# Patient Record
Sex: MALE | Race: WHITE | NOT HISPANIC OR LATINO | URBAN - METROPOLITAN AREA
[De-identification: names, ages, dates, MRNs, and addresses within clinical notes are randomized per-mention and may not be internally consistent; named-entity substitution may affect disease eponyms.]

---

## 2018-05-28 ENCOUNTER — INPATIENT (INPATIENT)
Facility: HOSPITAL | Age: 48
LOS: 2 days | Discharge: HOME | End: 2018-05-31
Attending: SPECIALIST | Admitting: SPECIALIST
Payer: COMMERCIAL

## 2018-05-28 VITALS
HEART RATE: 100 BPM | OXYGEN SATURATION: 97 % | SYSTOLIC BLOOD PRESSURE: 133 MMHG | RESPIRATION RATE: 16 BRPM | TEMPERATURE: 99 F | DIASTOLIC BLOOD PRESSURE: 92 MMHG

## 2018-05-28 DIAGNOSIS — K57.92 DIVERTICULITIS OF INTESTINE, PART UNSPECIFIED, WITHOUT PERFORATION OR ABSCESS WITHOUT BLEEDING: ICD-10-CM

## 2018-05-28 LAB
ALBUMIN SERPL ELPH-MCNC: 3.9 G/DL — SIGNIFICANT CHANGE UP (ref 3.3–5)
ALP SERPL-CCNC: 107 U/L — SIGNIFICANT CHANGE UP (ref 40–120)
ALT FLD-CCNC: 51 U/L — HIGH (ref 4–41)
AST SERPL-CCNC: 29 U/L — SIGNIFICANT CHANGE UP (ref 4–40)
BILIRUB SERPL-MCNC: 0.8 MG/DL — SIGNIFICANT CHANGE UP (ref 0.2–1.2)
BUN SERPL-MCNC: 13 MG/DL — SIGNIFICANT CHANGE UP (ref 7–23)
CALCIUM SERPL-MCNC: 9.1 MG/DL — SIGNIFICANT CHANGE UP (ref 8.4–10.5)
CHLORIDE SERPL-SCNC: 96 MMOL/L — LOW (ref 98–107)
CO2 SERPL-SCNC: 25 MMOL/L — SIGNIFICANT CHANGE UP (ref 22–31)
CREAT SERPL-MCNC: 0.78 MG/DL — SIGNIFICANT CHANGE UP (ref 0.5–1.3)
GLUCOSE SERPL-MCNC: 132 MG/DL — HIGH (ref 70–99)
HCT VFR BLD CALC: 41.3 % — SIGNIFICANT CHANGE UP (ref 39–50)
HGB BLD-MCNC: 13.8 G/DL — SIGNIFICANT CHANGE UP (ref 13–17)
LIDOCAIN IGE QN: 277.7 U/L — HIGH (ref 7–60)
MCHC RBC-ENTMCNC: 29.2 PG — SIGNIFICANT CHANGE UP (ref 27–34)
MCHC RBC-ENTMCNC: 33.4 % — SIGNIFICANT CHANGE UP (ref 32–36)
MCV RBC AUTO: 87.3 FL — SIGNIFICANT CHANGE UP (ref 80–100)
NRBC # FLD: 0 — SIGNIFICANT CHANGE UP
PLATELET # BLD AUTO: 270 K/UL — SIGNIFICANT CHANGE UP (ref 150–400)
PMV BLD: 9 FL — SIGNIFICANT CHANGE UP (ref 7–13)
POTASSIUM SERPL-MCNC: 4.4 MMOL/L — SIGNIFICANT CHANGE UP (ref 3.5–5.3)
POTASSIUM SERPL-SCNC: 4.4 MMOL/L — SIGNIFICANT CHANGE UP (ref 3.5–5.3)
PROT SERPL-MCNC: 7.3 G/DL — SIGNIFICANT CHANGE UP (ref 6–8.3)
RBC # BLD: 4.73 M/UL — SIGNIFICANT CHANGE UP (ref 4.2–5.8)
RBC # FLD: 12.2 % — SIGNIFICANT CHANGE UP (ref 10.3–14.5)
SODIUM SERPL-SCNC: 134 MMOL/L — LOW (ref 135–145)
WBC # BLD: 22.8 K/UL — HIGH (ref 3.8–10.5)
WBC # FLD AUTO: 22.8 K/UL — HIGH (ref 3.8–10.5)

## 2018-05-28 PROCEDURE — 74177 CT ABD & PELVIS W/CONTRAST: CPT | Mod: 26

## 2018-05-28 RX ORDER — SODIUM CHLORIDE 9 MG/ML
1000 INJECTION INTRAMUSCULAR; INTRAVENOUS; SUBCUTANEOUS ONCE
Qty: 0 | Refills: 0 | Status: COMPLETED | OUTPATIENT
Start: 2018-05-28 | End: 2018-05-28

## 2018-05-28 RX ORDER — METRONIDAZOLE 500 MG
500 TABLET ORAL ONCE
Qty: 0 | Refills: 0 | Status: COMPLETED | OUTPATIENT
Start: 2018-05-28 | End: 2018-05-28

## 2018-05-28 RX ORDER — SODIUM CHLORIDE 9 MG/ML
1000 INJECTION, SOLUTION INTRAVENOUS
Qty: 0 | Refills: 0 | Status: DISCONTINUED | OUTPATIENT
Start: 2018-05-28 | End: 2018-05-29

## 2018-05-28 RX ORDER — ONDANSETRON 8 MG/1
4 TABLET, FILM COATED ORAL ONCE
Qty: 0 | Refills: 0 | Status: COMPLETED | OUTPATIENT
Start: 2018-05-28 | End: 2018-05-28

## 2018-05-28 RX ORDER — CIPROFLOXACIN LACTATE 400MG/40ML
400 VIAL (ML) INTRAVENOUS EVERY 12 HOURS
Qty: 0 | Refills: 0 | Status: DISCONTINUED | OUTPATIENT
Start: 2018-05-28 | End: 2018-05-30

## 2018-05-28 RX ORDER — CIPROFLOXACIN LACTATE 400MG/40ML
400 VIAL (ML) INTRAVENOUS ONCE
Qty: 0 | Refills: 0 | Status: COMPLETED | OUTPATIENT
Start: 2018-05-28 | End: 2018-05-28

## 2018-05-28 RX ORDER — MORPHINE SULFATE 50 MG/1
4 CAPSULE, EXTENDED RELEASE ORAL ONCE
Qty: 0 | Refills: 0 | Status: DISCONTINUED | OUTPATIENT
Start: 2018-05-28 | End: 2018-05-28

## 2018-05-28 RX ORDER — ENOXAPARIN SODIUM 100 MG/ML
40 INJECTION SUBCUTANEOUS DAILY
Qty: 0 | Refills: 0 | Status: DISCONTINUED | OUTPATIENT
Start: 2018-05-28 | End: 2018-05-31

## 2018-05-28 RX ORDER — METRONIDAZOLE 500 MG
500 TABLET ORAL EVERY 8 HOURS
Qty: 0 | Refills: 0 | Status: DISCONTINUED | OUTPATIENT
Start: 2018-05-28 | End: 2018-05-30

## 2018-05-28 RX ADMIN — Medication 200 MILLIGRAM(S): at 14:38

## 2018-05-28 RX ADMIN — MORPHINE SULFATE 4 MILLIGRAM(S): 50 CAPSULE, EXTENDED RELEASE ORAL at 13:03

## 2018-05-28 RX ADMIN — Medication 100 MILLIGRAM(S): at 21:44

## 2018-05-28 RX ADMIN — ENOXAPARIN SODIUM 40 MILLIGRAM(S): 100 INJECTION SUBCUTANEOUS at 17:35

## 2018-05-28 RX ADMIN — SODIUM CHLORIDE 1000 MILLILITER(S): 9 INJECTION INTRAMUSCULAR; INTRAVENOUS; SUBCUTANEOUS at 16:19

## 2018-05-28 RX ADMIN — SODIUM CHLORIDE 125 MILLILITER(S): 9 INJECTION, SOLUTION INTRAVENOUS at 17:35

## 2018-05-28 RX ADMIN — MORPHINE SULFATE 4 MILLIGRAM(S): 50 CAPSULE, EXTENDED RELEASE ORAL at 13:18

## 2018-05-28 RX ADMIN — ONDANSETRON 4 MILLIGRAM(S): 8 TABLET, FILM COATED ORAL at 13:03

## 2018-05-28 RX ADMIN — SODIUM CHLORIDE 1000 MILLILITER(S): 9 INJECTION INTRAMUSCULAR; INTRAVENOUS; SUBCUTANEOUS at 13:03

## 2018-05-28 RX ADMIN — Medication 100 MILLIGRAM(S): at 14:13

## 2018-05-28 NOTE — ED PROVIDER NOTE - OBJECTIVE STATEMENT
47 yr old male with no sig PMH presents with following hx 2 months of abdominal distension but for 2 days significant LLQ.  Denies diarrhea, hematochezia.  Went to [facility] and was told to come to ED.  Denies fever, vomiting.

## 2018-05-28 NOTE — H&P ADULT - NSHPPHYSICALEXAM_GEN_ALL_CORE
Vital Signs Last 24 Hrs  T(C): 37.2 (28 May 2018 11:26), Max: 37.2 (28 May 2018 11:26)  T(F): 99 (28 May 2018 11:26), Max: 99 (28 May 2018 11:26)  HR: 104 (28 May 2018 16:20) (100 - 104)  BP: 128/76 (28 May 2018 16:20) (128/76 - 133/92)  BP(mean): --  RR: 18 (28 May 2018 16:20) (16 - 18)  SpO2: 100% (28 May 2018 16:20) (97% - 100%)    General: No acute distress, appears nontoxic  Neurologic: No focal deficits  Psychiatric: Appropriate affect  Cardiovascular: Regular rate and rhythm  Pulmonary: Unlabored breathing  Abdominal: Soft, distended, LLQ abdominal tenderness   Extremities: No edema, moves all without limitations  Skin: Warm, no rashes

## 2018-05-28 NOTE — PATIENT PROFILE ADULT. - VISION (WITH CORRECTIVE LENSES IF THE PATIENT USUALLY WEARS THEM):
reading & distance/Normal vision: sees adequately in most situations; can see medication labels, newsprint

## 2018-05-28 NOTE — H&P ADULT - PROBLEM SELECTOR PLAN 1
- No acute surgical intervention  - NPO/IVF  - broad spectrum IV antibiotics  - serial abdominal exams  - Discussed with Dr. Lainez

## 2018-05-28 NOTE — H&P ADULT - HISTORY OF PRESENT ILLNESS
47 year old man with no significant medical/surgical history presents with 2-3 days of left lower quadrant abdominal pain.  The pain is moderate, nonradiating and with no alleviating or aggravating factors.  He has associated abdominal distension.  No fevers/chills.  He has been passing flatus and having normal bowel movements.  He has never had a colonoscopy.

## 2018-05-28 NOTE — ED ADULT NURSE NOTE - OBJECTIVE STATEMENT
pt in rm 12. i. alert,oriented x3. surg resident to eval at present. pt c/o abd pains with abd distended x mos. denies n,v presently.  will continue  to monitor

## 2018-05-28 NOTE — ED PROVIDER NOTE - PROGRESS NOTE DETAILS
Dr. Flores: pt with elevated lipase and LLQ pain with criteria for sepsis; have consulted surgery and started abx; pending CT; will sign out to incoming attending

## 2018-05-28 NOTE — H&P ADULT - NSHPLABSRESULTS_GEN_ALL_CORE
CBC (05-28 @ 13:13)                          13.8                     22.80<H>  )--------------(  270        --    % Neuts, --    % Lymphs, ANC: --                              41.3      BMP (05-28 @ 13:13)       134<L>  |  96<L>   |  13    			Ca++ --      Ca 9.1          ---------------------------------( 132<H>		Mg --           4.4     |  25      |  0.78  			Ph --        LFTs (05-28 @ 13:13)      TPro 7.3 / Alb 3.9 / TBili 0.8 / DBili -- / AST 29 / ALT 51<H> / AlkPhos 107      Imaging  CT abdomen/pelvis showing sigmoid diverticulitis, no associated abscess or massive free air on preliminary read

## 2018-05-28 NOTE — H&P ADULT - ATTENDING COMMENTS
Above noted. the patient has been intermittently symptomatic  (abdominal pain and distention) for the last two to three months.   Physical Exam: Afebrile  Abdomen: Soft, distended, tender over the left lower quadrant.  Labs: WBC 92591.  Plan: NPO, IV antibiotic, DVT prophylaxis, pain control, close observation.

## 2018-05-28 NOTE — H&P ADULT - ASSESSMENT
47 year old man with no significant medical or surgical history presents with first episode diverticulitis, uncomplicated. 47 year old man with no significant medical or surgical history presents with first episode of sigmoid diverticulitis

## 2018-05-29 ENCOUNTER — TRANSCRIPTION ENCOUNTER (OUTPATIENT)
Age: 48
End: 2018-05-29

## 2018-05-29 LAB
BUN SERPL-MCNC: 8 MG/DL — SIGNIFICANT CHANGE UP (ref 7–23)
CALCIUM SERPL-MCNC: 8.9 MG/DL — SIGNIFICANT CHANGE UP (ref 8.4–10.5)
CHLORIDE SERPL-SCNC: 98 MMOL/L — SIGNIFICANT CHANGE UP (ref 98–107)
CO2 SERPL-SCNC: 25 MMOL/L — SIGNIFICANT CHANGE UP (ref 22–31)
CREAT SERPL-MCNC: 0.82 MG/DL — SIGNIFICANT CHANGE UP (ref 0.5–1.3)
GLUCOSE SERPL-MCNC: 104 MG/DL — HIGH (ref 70–99)
HCT VFR BLD CALC: 38.9 % — LOW (ref 39–50)
HGB BLD-MCNC: 13 G/DL — SIGNIFICANT CHANGE UP (ref 13–17)
MAGNESIUM SERPL-MCNC: 2.3 MG/DL — SIGNIFICANT CHANGE UP (ref 1.6–2.6)
MCHC RBC-ENTMCNC: 29 PG — SIGNIFICANT CHANGE UP (ref 27–34)
MCHC RBC-ENTMCNC: 33.4 % — SIGNIFICANT CHANGE UP (ref 32–36)
MCV RBC AUTO: 86.8 FL — SIGNIFICANT CHANGE UP (ref 80–100)
NRBC # FLD: 0 — SIGNIFICANT CHANGE UP
PHOSPHATE SERPL-MCNC: 2.7 MG/DL — SIGNIFICANT CHANGE UP (ref 2.5–4.5)
PLATELET # BLD AUTO: 263 K/UL — SIGNIFICANT CHANGE UP (ref 150–400)
PMV BLD: 9.1 FL — SIGNIFICANT CHANGE UP (ref 7–13)
POTASSIUM SERPL-MCNC: 4.3 MMOL/L — SIGNIFICANT CHANGE UP (ref 3.5–5.3)
POTASSIUM SERPL-SCNC: 4.3 MMOL/L — SIGNIFICANT CHANGE UP (ref 3.5–5.3)
RBC # BLD: 4.48 M/UL — SIGNIFICANT CHANGE UP (ref 4.2–5.8)
RBC # FLD: 12.1 % — SIGNIFICANT CHANGE UP (ref 10.3–14.5)
SODIUM SERPL-SCNC: 137 MMOL/L — SIGNIFICANT CHANGE UP (ref 135–145)
WBC # BLD: 13.21 K/UL — HIGH (ref 3.8–10.5)
WBC # FLD AUTO: 13.21 K/UL — HIGH (ref 3.8–10.5)

## 2018-05-29 RX ORDER — SODIUM CHLORIDE 9 MG/ML
1000 INJECTION, SOLUTION INTRAVENOUS
Qty: 0 | Refills: 0 | Status: DISCONTINUED | OUTPATIENT
Start: 2018-05-29 | End: 2018-05-30

## 2018-05-29 RX ADMIN — Medication 63.75 MILLIMOLE(S): at 09:25

## 2018-05-29 RX ADMIN — SODIUM CHLORIDE 100 MILLILITER(S): 9 INJECTION, SOLUTION INTRAVENOUS at 09:27

## 2018-05-29 RX ADMIN — Medication 200 MILLIGRAM(S): at 13:27

## 2018-05-29 RX ADMIN — ENOXAPARIN SODIUM 40 MILLIGRAM(S): 100 INJECTION SUBCUTANEOUS at 13:39

## 2018-05-29 RX ADMIN — Medication 200 MILLIGRAM(S): at 01:19

## 2018-05-29 RX ADMIN — Medication 100 MILLIGRAM(S): at 13:27

## 2018-05-29 RX ADMIN — Medication 100 MILLIGRAM(S): at 05:57

## 2018-05-29 RX ADMIN — Medication 100 MILLIGRAM(S): at 21:35

## 2018-05-29 NOTE — DISCHARGE NOTE ADULT - HOSPITAL COURSE
47 year old man with no significant medical/surgical history presents with 2-3 days of left lower quadrant abdominal pain.  The pain is moderate, nonradiating and with no alleviating or aggravating factors.  He has associated abdominal distension.  No fevers/chills.  He has been passing flatus and having normal bowel movements.  He has never had a colonoscopy.  Pt admitted to [facility] for diverticulitis. CT on 5/28 showed: Perforated sigmoid diverticulitis. No adjacent defined fluid collection or abscess.   Pt was made NPO, started on IVF, IV antibiotics and  provided with pain medication as needed.  Pt had serial ABD exams.  Pt's leukocytosis improved along with diminished abdominal pain/tenderness.  Pt's diet was slowly advanced as tolerated. At this time pt deemed stable for discharge with continued antibiotics and follow up in the office. 47 year old man with no significant medical/surgical history presents with 2-3 days of left lower quadrant abdominal pain.  The pain is moderate, nonradiating and with no alleviating or aggravating factors.  He has associated abdominal distension.  No fevers/chills.  He has been passing flatus and having normal bowel movements.  He has never had a colonoscopy.  Pt admitted to [facility] for diverticulitis. CT on 5/28 showed: Perforated sigmoid diverticulitis. No adjacent defined fluid collection or abscess.   Pt was made NPO, started on IVF, IV antibiotics and  provided with pain medication as needed.  Pt had serial ABD exams.  Pt's leukocytosis improved along with diminished abdominal pain/tenderness.  Pt's diet was slowly advanced as tolerated. At this time pt deemed stable for discharge with continued antibiotics and follow up in the office.

## 2018-05-29 NOTE — DISCHARGE NOTE ADULT - CARE PROVIDER_API CALL
Rai Lainez), Surgery  [address] [facility]  [address] [place] [id number]  Phone: [phone]  Fax: [phone]

## 2018-05-29 NOTE — DISCHARGE NOTE ADULT - PLAN OF CARE
Continue low fiber diet Please call the doctor immediately if you develop fever, chills, inability to tolerate liquid or food, diarrhea, nausea, vomiting or return of abdominal pain. Please follow up with Dr. Lainez and with your  primary care doctor within 1 week of discharge. Please call the doctor immediately if you develop fever, chills, inability to tolerate liquid or food, diarrhea, nausea, vomiting or return of abdominal pain. Please follow up with Dr. Lainez and with your primary care doctor within 1 week of discharge.

## 2018-05-29 NOTE — DISCHARGE NOTE ADULT - CARE PLAN
Principal Discharge DX:	Diverticulitis  Goal:	Continue low fiber diet  Assessment and plan of treatment:	Please call the doctor immediately if you develop fever, chills, inability to tolerate liquid or food, diarrhea, nausea, vomiting or return of abdominal pain. Please follow up with Dr. Lainez and with your  primary care doctor within 1 week of discharge. Principal Discharge DX:	Diverticulitis  Goal:	Continue low fiber diet  Assessment and plan of treatment:	Please call the doctor immediately if you develop fever, chills, inability to tolerate liquid or food, diarrhea, nausea, vomiting or return of abdominal pain. Please follow up with Dr. Lainez and with your primary care doctor within 1 week of discharge.

## 2018-05-29 NOTE — PROGRESS NOTE ADULT - ATTENDING COMMENTS
Above noted. Feels better, passing flatus, had a bowel movement this morning.  Physical Exam: Low grade fever.  Abdomen: Soft, obese, less distended, less tender.  Labs: WBC 12823.  Plan: Continue NPO except for sips of water. IV antibiotic, DVT prophylaxis.

## 2018-05-29 NOTE — DISCHARGE NOTE ADULT - CONDITIONS AT DISCHARGE
Pt A&Ox4. Vs stable. Pain well controlled. Pt OOB, ambulating in room and on unit, tolerating regular diet, and voiding adequately.

## 2018-05-29 NOTE — PROGRESS NOTE ADULT - SUBJECTIVE AND OBJECTIVE BOX
General Surgery Progress Note  JODI NICHOLS    S: No acute events overnight. Patient has adequate pain control. Denies N/V and fevers/chills. Overall doing well.    O:  T(C): 37 (05-29-18 @ 05:57), Max: 37.8 (05-28-18 @ 21:53)  HR: 99 (05-29-18 @ 05:57) (99 - 111)  BP: 141/81 (05-29-18 @ 05:57) (128/76 - 141/94)  RR: 18 (05-29-18 @ 05:57) (16 - 18)  SpO2: 100% (05-29-18 @ 05:57) (96% - 100%)        Physical Exam:  Gen: NAD  Neuro: Follows commands  Resp: No acute respiratory distress, normal effort  CV: Normal rate, regular rhythm  Abd: Soft, ND, tenderness improved.    A/P: 47 year old man with no significant medical or surgical history presents with first episode of sigmoid diverticulitis. Pain improved.    - Cont IV abx  - NPO/IVF  - Pain control  - OOB as tolerated  - Monitor GI/ fxn  - Dispo: Floor

## 2018-05-29 NOTE — DISCHARGE NOTE ADULT - PATIENT PORTAL LINK FT
You can access the [facility] Patient Portal, offered by [facility], by registering with the following website: [email or web address]

## 2018-05-29 NOTE — DISCHARGE NOTE ADULT - MEDICATION SUMMARY - MEDICATIONS TO TAKE
I will START or STAY ON the medications listed below when I get home from the hospital:    metroNIDAZOLE 500 mg oral tablet  -- 1 tab(s) by mouth every 8 hours  -- Indication: For Diverticulitis of intestine without perforation or abscess without bleeding    ciprofloxacin 500 mg oral tablet  -- 1 tab(s) by mouth every 12 hours  -- Indication: For Diverticulitis of intestine without perforation or abscess without bleeding

## 2018-05-29 NOTE — DISCHARGE NOTE ADULT - INSTRUCTIONS
Low fiber diet Please NOTIFY MD for any of the following s/s: S/S infection (Fever >100.4, chills), uncontrolled pain not relieved by pain medications, persistent nausea/vomiting or inability to tolerate diet. No driving while taking narcotic pain medications. Please drink 6-8 glasses of water daily to stay hydrated.

## 2018-05-30 LAB
BLD GP AB SCN SERPL QL: NEGATIVE — SIGNIFICANT CHANGE UP
BUN SERPL-MCNC: 7 MG/DL — SIGNIFICANT CHANGE UP (ref 7–23)
CALCIUM SERPL-MCNC: 9.2 MG/DL — SIGNIFICANT CHANGE UP (ref 8.4–10.5)
CHLORIDE SERPL-SCNC: 97 MMOL/L — LOW (ref 98–107)
CO2 SERPL-SCNC: 27 MMOL/L — SIGNIFICANT CHANGE UP (ref 22–31)
CREAT SERPL-MCNC: 0.84 MG/DL — SIGNIFICANT CHANGE UP (ref 0.5–1.3)
GLUCOSE SERPL-MCNC: 111 MG/DL — HIGH (ref 70–99)
HCT VFR BLD CALC: 38 % — LOW (ref 39–50)
HGB BLD-MCNC: 12.8 G/DL — LOW (ref 13–17)
MAGNESIUM SERPL-MCNC: 2.4 MG/DL — SIGNIFICANT CHANGE UP (ref 1.6–2.6)
MCHC RBC-ENTMCNC: 29.4 PG — SIGNIFICANT CHANGE UP (ref 27–34)
MCHC RBC-ENTMCNC: 33.7 % — SIGNIFICANT CHANGE UP (ref 32–36)
MCV RBC AUTO: 87.4 FL — SIGNIFICANT CHANGE UP (ref 80–100)
NRBC # FLD: 0 — SIGNIFICANT CHANGE UP
PHOSPHATE SERPL-MCNC: 3.6 MG/DL — SIGNIFICANT CHANGE UP (ref 2.5–4.5)
PLATELET # BLD AUTO: 291 K/UL — SIGNIFICANT CHANGE UP (ref 150–400)
PMV BLD: 8.9 FL — SIGNIFICANT CHANGE UP (ref 7–13)
POTASSIUM SERPL-MCNC: 4.3 MMOL/L — SIGNIFICANT CHANGE UP (ref 3.5–5.3)
POTASSIUM SERPL-SCNC: 4.3 MMOL/L — SIGNIFICANT CHANGE UP (ref 3.5–5.3)
RBC # BLD: 4.35 M/UL — SIGNIFICANT CHANGE UP (ref 4.2–5.8)
RBC # FLD: 12 % — SIGNIFICANT CHANGE UP (ref 10.3–14.5)
RH IG SCN BLD-IMP: POSITIVE — SIGNIFICANT CHANGE UP
SODIUM SERPL-SCNC: 137 MMOL/L — SIGNIFICANT CHANGE UP (ref 135–145)
WBC # BLD: 8.74 K/UL — SIGNIFICANT CHANGE UP (ref 3.8–10.5)
WBC # FLD AUTO: 8.74 K/UL — SIGNIFICANT CHANGE UP (ref 3.8–10.5)

## 2018-05-30 RX ORDER — CIPROFLOXACIN LACTATE 400MG/40ML
500 VIAL (ML) INTRAVENOUS EVERY 12 HOURS
Qty: 0 | Refills: 0 | Status: DISCONTINUED | OUTPATIENT
Start: 2018-05-30 | End: 2018-05-31

## 2018-05-30 RX ORDER — METRONIDAZOLE 500 MG
500 TABLET ORAL ONCE
Qty: 0 | Refills: 0 | Status: DISCONTINUED | OUTPATIENT
Start: 2018-05-30 | End: 2018-05-30

## 2018-05-30 RX ORDER — METRONIDAZOLE 500 MG
500 TABLET ORAL EVERY 8 HOURS
Qty: 0 | Refills: 0 | Status: DISCONTINUED | OUTPATIENT
Start: 2018-05-30 | End: 2018-05-30

## 2018-05-30 RX ORDER — METRONIDAZOLE 500 MG
500 TABLET ORAL EVERY 8 HOURS
Qty: 0 | Refills: 0 | Status: DISCONTINUED | OUTPATIENT
Start: 2018-05-30 | End: 2018-05-31

## 2018-05-30 RX ORDER — METRONIDAZOLE 500 MG
TABLET ORAL
Qty: 0 | Refills: 0 | Status: DISCONTINUED | OUTPATIENT
Start: 2018-05-30 | End: 2018-05-30

## 2018-05-30 RX ADMIN — Medication 200 MILLIGRAM(S): at 01:43

## 2018-05-30 RX ADMIN — Medication 500 MILLIGRAM(S): at 21:36

## 2018-05-30 RX ADMIN — Medication 200 MILLIGRAM(S): at 13:04

## 2018-05-30 RX ADMIN — Medication 100 MILLIGRAM(S): at 06:28

## 2018-05-30 RX ADMIN — Medication 100 MILLIGRAM(S): at 13:04

## 2018-05-30 RX ADMIN — ENOXAPARIN SODIUM 40 MILLIGRAM(S): 100 INJECTION SUBCUTANEOUS at 13:04

## 2018-05-30 NOTE — PROGRESS NOTE ADULT - SUBJECTIVE AND OBJECTIVE BOX
General Surgery Progress Note  JODI JUANK    S: No acute events overnight. Patient has adequate pain control. Denies N/V and fevers/chills. Overall doing well.    O:  Vital Signs Last 24 Hrs  T(C): 37.3 (30 May 2018 02:09), Max: 37.3 (30 May 2018 02:09)  T(F): 99.2 (30 May 2018 02:09), Max: 99.2 (30 May 2018 02:09)  HR: 88 (30 May 2018 02:09) (88 - 101)  BP: 113/74 (30 May 2018 02:09) (112/78 - 141/81)  BP(mean): --  RR: 18 (30 May 2018 02:09) (16 - 18)  SpO2: 98% (30 May 2018 02:09) (96% - 100%)        Physical Exam:  Gen: NAD  Neuro: Follows commands  Resp: No acute respiratory distress, normal effort  CV: Normal rate, regular rhythm  Abd: Soft, ND, tenderness improved.    A/P: 47 year old man with no significant medical or surgical history presents with first episode of sigmoid diverticulitis. Pain improved.    - Cont IV abx  - Sips, possibly adv to clears later today   - Pain control  - OOB as tolerated  - Monitor GI/ fxn  - Dispo: Floor

## 2018-05-30 NOTE — PROGRESS NOTE ADULT - ATTENDING COMMENTS
Above noted. Feels much better, tolerating a clear liquid diet, having diarrhea.  Physical Exam: Afebrile.  Abdomen: Soft, less distended, minimal left lower quadrant tenderness.  Labs: WBC 8100.  Plan: Continue IV antibiotic, clear liquid diet.

## 2018-05-31 VITALS
HEART RATE: 98 BPM | SYSTOLIC BLOOD PRESSURE: 118 MMHG | DIASTOLIC BLOOD PRESSURE: 74 MMHG | RESPIRATION RATE: 18 BRPM | OXYGEN SATURATION: 96 %

## 2018-05-31 LAB
BUN SERPL-MCNC: 9 MG/DL — SIGNIFICANT CHANGE UP (ref 7–23)
CALCIUM SERPL-MCNC: 9.4 MG/DL — SIGNIFICANT CHANGE UP (ref 8.4–10.5)
CHLORIDE SERPL-SCNC: 97 MMOL/L — LOW (ref 98–107)
CO2 SERPL-SCNC: 26 MMOL/L — SIGNIFICANT CHANGE UP (ref 22–31)
CREAT SERPL-MCNC: 0.85 MG/DL — SIGNIFICANT CHANGE UP (ref 0.5–1.3)
GLUCOSE SERPL-MCNC: 105 MG/DL — HIGH (ref 70–99)
HCT VFR BLD CALC: 40.5 % — SIGNIFICANT CHANGE UP (ref 39–50)
HGB BLD-MCNC: 13.7 G/DL — SIGNIFICANT CHANGE UP (ref 13–17)
MCHC RBC-ENTMCNC: 29.3 PG — SIGNIFICANT CHANGE UP (ref 27–34)
MCHC RBC-ENTMCNC: 33.8 % — SIGNIFICANT CHANGE UP (ref 32–36)
MCV RBC AUTO: 86.5 FL — SIGNIFICANT CHANGE UP (ref 80–100)
NRBC # FLD: 0 — SIGNIFICANT CHANGE UP
PLATELET # BLD AUTO: 338 K/UL — SIGNIFICANT CHANGE UP (ref 150–400)
PMV BLD: 8.8 FL — SIGNIFICANT CHANGE UP (ref 7–13)
POTASSIUM SERPL-MCNC: 4.2 MMOL/L — SIGNIFICANT CHANGE UP (ref 3.5–5.3)
POTASSIUM SERPL-SCNC: 4.2 MMOL/L — SIGNIFICANT CHANGE UP (ref 3.5–5.3)
RBC # BLD: 4.68 M/UL — SIGNIFICANT CHANGE UP (ref 4.2–5.8)
RBC # FLD: 11.9 % — SIGNIFICANT CHANGE UP (ref 10.3–14.5)
SODIUM SERPL-SCNC: 137 MMOL/L — SIGNIFICANT CHANGE UP (ref 135–145)
WBC # BLD: 10.05 K/UL — SIGNIFICANT CHANGE UP (ref 3.8–10.5)
WBC # FLD AUTO: 10.05 K/UL — SIGNIFICANT CHANGE UP (ref 3.8–10.5)

## 2018-05-31 RX ORDER — METRONIDAZOLE 500 MG
1 TABLET ORAL
Qty: 30 | Refills: 0
Start: 2018-05-31 | End: 2018-06-09

## 2018-05-31 RX ORDER — CIPROFLOXACIN LACTATE 400MG/40ML
1 VIAL (ML) INTRAVENOUS
Qty: 20 | Refills: 0
Start: 2018-05-31 | End: 2018-06-09

## 2018-05-31 RX ADMIN — ENOXAPARIN SODIUM 40 MILLIGRAM(S): 100 INJECTION SUBCUTANEOUS at 13:21

## 2018-05-31 RX ADMIN — Medication 500 MILLIGRAM(S): at 05:30

## 2018-05-31 RX ADMIN — Medication 500 MILLIGRAM(S): at 18:56

## 2018-05-31 RX ADMIN — Medication 500 MILLIGRAM(S): at 13:20

## 2018-05-31 NOTE — PROGRESS NOTE ADULT - ATTENDING COMMENTS
Above noted. Feels well, just finished his first low residue diet. Denies pain, had 5 episodes of diarrhea yesterday, two so far today.   Plan: Discharge after dinner tonight if he remains asymptomatic.  Follow-up office visit in two weeks.

## 2018-05-31 NOTE — PROGRESS NOTE ADULT - SUBJECTIVE AND OBJECTIVE BOX
General Surgery Progress Note  JODI MAGDALENA    S: No acute events overnight.  Denies N/V and fevers/chills. Tolerated CLD all day yesterday without issue. Pain resolved.    O:  T(C): 36.9 (05-31-18 @ 02:13), Max: 37 (05-30-18 @ 06:28)  HR: 81 (05-31-18 @ 02:13) (80 - 92)  BP: 118/77 (05-31-18 @ 02:13) (114/85 - 137/85)  RR: 18 (05-31-18 @ 02:13) (17 - 18)  SpO2: 98% (05-31-18 @ 02:13) (96% - 100%)      Physical Exam:  Gen: NAD  Resp: No acute respiratory distress, normal effort  Abd: Soft, ND, tenderness improved.      A/P: 47 year old man with no significant medical or surgical history presents with first episode of sigmoid diverticulitis. Pain improved.    - C/w PO abx  - Adv diet to regular  - Monitor GI/ fxn  - Pain control  - OOB as tolerated  - Dispo: Likely discharge to home this afternoon/ evening if tolerating diet.

## 2018-06-12 PROBLEM — Z00.00 ENCOUNTER FOR PREVENTIVE HEALTH EXAMINATION: Status: ACTIVE | Noted: 2018-06-12

## 2018-06-13 ENCOUNTER — APPOINTMENT (OUTPATIENT)
Dept: SURGERY | Facility: CLINIC | Age: 48
End: 2018-06-13

## 2020-09-28 ENCOUNTER — EMERGENCY (EMERGENCY)
Facility: HOSPITAL | Age: 50
LOS: 1 days | Discharge: HOME | End: 2020-09-28
Attending: EMERGENCY MEDICINE | Admitting: EMERGENCY MEDICINE
Payer: COMMERCIAL

## 2020-09-28 VITALS
HEIGHT: 68 IN | HEART RATE: 112 BPM | DIASTOLIC BLOOD PRESSURE: 86 MMHG | TEMPERATURE: 99 F | RESPIRATION RATE: 16 BRPM | OXYGEN SATURATION: 100 % | SYSTOLIC BLOOD PRESSURE: 144 MMHG

## 2020-09-28 PROCEDURE — 99284 EMERGENCY DEPT VISIT MOD MDM: CPT

## 2020-09-28 PROCEDURE — 70450 CT HEAD/BRAIN W/O DYE: CPT | Mod: 26

## 2020-09-28 RX ORDER — TETANUS TOXOID, REDUCED DIPHTHERIA TOXOID AND ACELLULAR PERTUSSIS VACCINE, ADSORBED 5; 2.5; 8; 8; 2.5 [IU]/.5ML; [IU]/.5ML; UG/.5ML; UG/.5ML; UG/.5ML
0.5 SUSPENSION INTRAMUSCULAR ONCE
Refills: 0 | Status: COMPLETED | OUTPATIENT
Start: 2020-09-28 | End: 2020-09-28

## 2020-09-28 RX ADMIN — TETANUS TOXOID, REDUCED DIPHTHERIA TOXOID AND ACELLULAR PERTUSSIS VACCINE, ADSORBED 0.5 MILLILITER(S): 5; 2.5; 8; 8; 2.5 SUSPENSION INTRAMUSCULAR at 22:32

## 2020-09-28 NOTE — ED ADULT TRIAGE NOTE - CHIEF COMPLAINT QUOTE
pt saw PCP today r/t head trauma 2/2 fall, slipped and fell on Sunday @ 0500, abrasion to posterior head, denies blood thinners dizziness weakness, NAD noted

## 2020-09-28 NOTE — ED PROVIDER NOTE - NSFOLLOWUPINSTRUCTIONS_ED_ALL_ED_FT
You likely have a mild concussion.    Take Tylenol/Motrin as needed for headache.      Recommend follow up with your primary care physician.    If you experience more than 3 episodes of vomiting a day, please return to the emergency department.

## 2020-09-28 NOTE — ED PROVIDER NOTE - PATIENT PORTAL LINK FT
You can access the FollowMyHealth Patient Portal offered by [facility] by registering at the following website: [email or web address]. By joining [employer]’s FollowMyHealth portal, you will also be able to view your health information using other applications (apps) compatible with our system.

## 2020-09-28 NOTE — ED PROVIDER NOTE - PROGRESS NOTE DETAILS
pt signed out pending results of CT scan which showed no acute intracranial abnormality.  Per sign out plan will dc with head injury instructions.

## 2020-09-28 NOTE — ED PROVIDER NOTE - OBJECTIVE STATEMENT
50M denies pmh presents after head trauma sustained ~36hr prior sent in by outside doctor for further evaluation.  Pt states he was intoxicated and had mechanical fall.  Was nauseous yesterday and mild headache, feels improved today.  Denies fever/chills, cp, sob, vomiting, focal neuro deficits.

## 2020-09-28 NOTE — ED ADULT NURSE NOTE - OBJECTIVE STATEMENT
Received patient in intake room 10C with S/P fall. patient tripped and fell on Sunday with abrasion to the top of the head. No bleeding . Denies any pain. Tetanus shot given as ordered. Awaiting for CT head.

## 2020-09-29 VITALS
SYSTOLIC BLOOD PRESSURE: 153 MMHG | RESPIRATION RATE: 16 BRPM | OXYGEN SATURATION: 98 % | DIASTOLIC BLOOD PRESSURE: 86 MMHG | TEMPERATURE: 99 F | HEART RATE: 89 BPM

## 2021-09-05 ENCOUNTER — TRANSCRIPTION ENCOUNTER (OUTPATIENT)
Age: 51
End: 2021-09-05

## 2021-11-22 NOTE — PATIENT PROFILE ADULT. - NS PRO OT REFERRAL QUES 1 YN
This is a recent snapshot of the patient's [facility] medical record.  For current drug dose and complete information and questions, call [phone] or In Basket pool, [facility] home infusion ([id number])  CSN Number:  [id number]      
no

## 2022-05-27 NOTE — ED PROVIDER NOTE - CONSTITUTIONAL NEGATIVE STATEMENT, MLM
[employer] [place] - Phone [phone] Fax [phone]  Expected CoPay: $0     CoPay Card Available: No    Foundation Assistance Needed:    Which Pharmacy is filling the prescription (Not needed for infusion/clinic administered): [id number] - [place], [place] - [address] [facility] [address]  Pharmacy Notified: Yes  Patient Notified: Yes         no fever and no chills.

## 2023-04-20 ENCOUNTER — NON-APPOINTMENT (OUTPATIENT)
Age: 53
End: 2023-04-20

## 2023-04-26 ENCOUNTER — NON-APPOINTMENT (OUTPATIENT)
Age: 53
End: 2023-04-26

## 2024-02-08 NOTE — ED ADULT NURSE NOTE - PAIN RATING/NUMBER SCALE (0-10): REST
February 8, 2024        Zoë Wyatt  [address]  [facility] [id number]    Michael Zoë,      Your health and wellness have never been more important. We have been trying to reach you to talk to you about scheduling a health and wellness visit to help you better manage your health and stay safe during these uncertain times.  This visit can be done in the comfort of your home with a Nurse Practitioner from [facility].      Key benefits for YOU?   Convenience: Our Nurse Practitioner can do screenings in your home that you would have to go out to the clinic for. They will talk about important care for you and help you set health goals for the new year.    Health exam:  The Nurse Practitioner will check your blood pressure, screen for diabetes, and check your lungs by doing a quick breathing test.   Better manage your health: The friendly, quality provider will review your medications, medical history, and talk about your health concerns and questions.  Take your time: The appointment is 45 minutes, allowing plenty of time to get to know your provider, review your health information, and talk about your health goals.  Get the care you need: The provider will share your health information, concerns, and priorities with your doctor, so that your doctor and care team can help you get the care you need, when you need it.    Next steps  To take advantage of this new program, please call us back to schedule an appointment at [phone], Monday - Friday, 8 a.m. ? 4 p.m. CST.  We can also answer any questions you may have about this appointment.      Be well!  Your health care partners at [facility]        0

## 2024-06-01 ENCOUNTER — TRANSCRIPTION ENCOUNTER (OUTPATIENT)
Age: 54
End: 2024-06-01

## 2024-06-01 ENCOUNTER — INPATIENT (INPATIENT)
Facility: HOSPITAL | Age: 54
LOS: 1 days | Discharge: HOME | End: 2024-06-03
Attending: INTERNAL MEDICINE | Admitting: INTERNAL MEDICINE
Payer: COMMERCIAL

## 2024-06-01 VITALS
TEMPERATURE: 98 F | DIASTOLIC BLOOD PRESSURE: 93 MMHG | RESPIRATION RATE: 16 BRPM | SYSTOLIC BLOOD PRESSURE: 145 MMHG | OXYGEN SATURATION: 95 % | HEART RATE: 105 BPM

## 2024-06-01 LAB
ALBUMIN SERPL ELPH-MCNC: 4.2 G/DL — SIGNIFICANT CHANGE UP (ref 3.3–5)
ALP SERPL-CCNC: 100 U/L — SIGNIFICANT CHANGE UP (ref 40–120)
ALT FLD-CCNC: 39 U/L — SIGNIFICANT CHANGE UP (ref 4–41)
ANION GAP SERPL CALC-SCNC: 14 MMOL/L — SIGNIFICANT CHANGE UP (ref 7–14)
AST SERPL-CCNC: 27 U/L — SIGNIFICANT CHANGE UP (ref 4–40)
BASOPHILS # BLD AUTO: 0.07 K/UL — SIGNIFICANT CHANGE UP (ref 0–0.2)
BASOPHILS NFR BLD AUTO: 0.7 % — SIGNIFICANT CHANGE UP (ref 0–2)
BILIRUB SERPL-MCNC: <0.2 MG/DL — SIGNIFICANT CHANGE UP (ref 0.2–1.2)
BUN SERPL-MCNC: 13 MG/DL — SIGNIFICANT CHANGE UP (ref 7–23)
CALCIUM SERPL-MCNC: 9.6 MG/DL — SIGNIFICANT CHANGE UP (ref 8.4–10.5)
CHLORIDE SERPL-SCNC: 100 MMOL/L — SIGNIFICANT CHANGE UP (ref 98–107)
CO2 SERPL-SCNC: 24 MMOL/L — SIGNIFICANT CHANGE UP (ref 22–31)
CREAT SERPL-MCNC: 0.73 MG/DL — SIGNIFICANT CHANGE UP (ref 0.5–1.3)
D DIMER BLD IA.RAPID-MCNC: <150 NG/ML DDU — SIGNIFICANT CHANGE UP
EGFR: 108 ML/MIN/1.73M2 — SIGNIFICANT CHANGE UP
EOSINOPHIL # BLD AUTO: 0.33 K/UL — SIGNIFICANT CHANGE UP (ref 0–0.5)
EOSINOPHIL NFR BLD AUTO: 3.2 % — SIGNIFICANT CHANGE UP (ref 0–6)
GLUCOSE SERPL-MCNC: 100 MG/DL — HIGH (ref 70–99)
HCT VFR BLD CALC: 43.3 % — SIGNIFICANT CHANGE UP (ref 39–50)
HGB BLD-MCNC: 15 G/DL — SIGNIFICANT CHANGE UP (ref 13–17)
IANC: 6.35 K/UL — SIGNIFICANT CHANGE UP (ref 1.8–7.4)
IMM GRANULOCYTES NFR BLD AUTO: 0.6 % — SIGNIFICANT CHANGE UP (ref 0–0.9)
LYMPHOCYTES # BLD AUTO: 2.77 K/UL — SIGNIFICANT CHANGE UP (ref 1–3.3)
LYMPHOCYTES # BLD AUTO: 26.5 % — SIGNIFICANT CHANGE UP (ref 13–44)
MCHC RBC-ENTMCNC: 29.8 PG — SIGNIFICANT CHANGE UP (ref 27–34)
MCHC RBC-ENTMCNC: 34.6 GM/DL — SIGNIFICANT CHANGE UP (ref 32–36)
MCV RBC AUTO: 85.9 FL — SIGNIFICANT CHANGE UP (ref 80–100)
MONOCYTES # BLD AUTO: 0.88 K/UL — SIGNIFICANT CHANGE UP (ref 0–0.9)
MONOCYTES NFR BLD AUTO: 8.4 % — SIGNIFICANT CHANGE UP (ref 2–14)
NEUTROPHILS # BLD AUTO: 6.35 K/UL — SIGNIFICANT CHANGE UP (ref 1.8–7.4)
NEUTROPHILS NFR BLD AUTO: 60.6 % — SIGNIFICANT CHANGE UP (ref 43–77)
NRBC # BLD: 0 /100 WBCS — SIGNIFICANT CHANGE UP (ref 0–0)
NRBC # FLD: 0 K/UL — SIGNIFICANT CHANGE UP (ref 0–0)
PLATELET # BLD AUTO: 295 K/UL — SIGNIFICANT CHANGE UP (ref 150–400)
POTASSIUM SERPL-MCNC: 4.5 MMOL/L — SIGNIFICANT CHANGE UP (ref 3.5–5.3)
POTASSIUM SERPL-SCNC: 4.5 MMOL/L — SIGNIFICANT CHANGE UP (ref 3.5–5.3)
PROT SERPL-MCNC: 7.1 G/DL — SIGNIFICANT CHANGE UP (ref 6–8.3)
RBC # BLD: 5.04 M/UL — SIGNIFICANT CHANGE UP (ref 4.2–5.8)
RBC # FLD: 12.6 % — SIGNIFICANT CHANGE UP (ref 10.3–14.5)
SODIUM SERPL-SCNC: 138 MMOL/L — SIGNIFICANT CHANGE UP (ref 135–145)
TROPONIN T, HIGH SENSITIVITY RESULT: 11 NG/L — SIGNIFICANT CHANGE UP
WBC # BLD: 10.46 K/UL — SIGNIFICANT CHANGE UP (ref 3.8–10.5)
WBC # FLD AUTO: 10.46 K/UL — SIGNIFICANT CHANGE UP (ref 3.8–10.5)

## 2024-06-01 PROCEDURE — 99285 EMERGENCY DEPT VISIT HI MDM: CPT

## 2024-06-01 RX ORDER — SODIUM CHLORIDE 9 MG/ML
1000 INJECTION INTRAMUSCULAR; INTRAVENOUS; SUBCUTANEOUS ONCE
Refills: 0 | Status: COMPLETED | OUTPATIENT
Start: 2024-06-01 | End: 2024-06-01

## 2024-06-01 RX ORDER — METOCLOPRAMIDE HCL 10 MG
10 TABLET ORAL ONCE
Refills: 0 | Status: COMPLETED | OUTPATIENT
Start: 2024-06-01 | End: 2024-06-01

## 2024-06-01 RX ORDER — KETOROLAC TROMETHAMINE 30 MG/ML
15 SYRINGE (ML) INJECTION ONCE
Refills: 0 | Status: DISCONTINUED | OUTPATIENT
Start: 2024-06-01 | End: 2024-06-01

## 2024-06-01 RX ADMIN — Medication 15 MILLIGRAM(S): at 22:33

## 2024-06-01 RX ADMIN — SODIUM CHLORIDE 1000 MILLILITER(S): 9 INJECTION INTRAMUSCULAR; INTRAVENOUS; SUBCUTANEOUS at 22:33

## 2024-06-01 RX ADMIN — Medication 10 MILLIGRAM(S): at 22:33

## 2024-06-01 NOTE — ED PROVIDER NOTE - PHYSICAL EXAMINATION
Gen: AAOx3, non-toxic  Head: NCAT  HEENT: EOMI, oral mucosa moist, normal conjunctiva  Lung: CTAB, no respiratory distress, no wheezes/rhonchi/rales B/L,   CV: RRR, no murmurs, rubs or gallops, non reproducible chest pressure  Abd: soft, NTND, no guarding, no CVA tenderness  MSK: no visible deformities  Neuro: No focal sensory or motor deficits  Skin: Warm, well perfused, no rash  Psych: normal affect.

## 2024-06-01 NOTE — ED PROVIDER NOTE - OBJECTIVE STATEMENT
53-year-old male no past medical history however does not see primary care doctor regularly presents ED complaining of left-sided chest pressure.  Started 3 days ago with intermittent left-sided chest pressure rating to left arm and posterior head.  Symptoms intermittent lasting for about 1 hour at a time, worse when walking, nonpleuritic, no associated diaphoresis, nausea, vomiting, diarrhea, shortness of breath, dysuria, hematuria.  Family history of CAD in father, brother.  Denies smoking history.  Denies recent travel.  No exacerbating or relieving factors.  Has not tried anything for symptoms.

## 2024-06-01 NOTE — ED ADULT TRIAGE NOTE - NS ED TRIAGE AVPU SCALE
Alert-The patient is alert, awake and responds to voice. The patient is oriented to time, place, and person. The triage nurse is able to obtain subjective information. patient

## 2024-06-01 NOTE — ED PROVIDER NOTE - CLINICAL SUMMARY MEDICAL DECISION MAKING FREE TEXT BOX
53-year-old male no past medical history however does not see primary care doctor regularly presents ED complaining of left-sided chest pressure.  Started 3 days ago with intermittent left-sided chest pressure rating to left arm and posterior head.  Symptoms intermittent lasting for about 1 hour at a time, worse when walking, nonpleuritic, no associated diaphoresis, nausea, vomiting, diarrhea, shortness of breath, dysuria, hematuria.  Family history of CAD in father, brother.  Denies smoking history.  Denies recent travel.  No exacerbating or relieving factors.  Has not tried anything for symptoms.     VS tachycardic to 105. Clinically stable. EKG wnl w no ST elevations or T wave inversions. PE, well appearing, no acute distress, AAOx3. NCAT, EOMI, normal conjunctiva, mucous membranes moist, LCTAB no w/r/c, no MRG, non-reproducible CP, RRR, abd NDNT, no rebound tenderness or guarding, no CVA ttp, no focal neuro deficits, neurovascularly intact, dp 2+, no bruising, rashes, or erythema. Initial heart score 4, moderate risk chest pain. Suspicion for ACS vs unstable/stable angina vs costochondritis vs gerd vs MSK wall pain vs PE. Will screen for ACS (troponin), dimer, anemia/electrolytes, and chest x ray to screen for cardiopulmonary pathology, given moderate risk chest pain, likely teledoc for stress test.

## 2024-06-01 NOTE — ED ADULT NURSE NOTE - AS PAIN REST
How Severe Are Your Bumps?: mild Have Your Bumps Been Treated?: not been treated Is This A New Presentation, Or A Follow-Up?: Bump 5 (moderate pain)

## 2024-06-01 NOTE — ED PROVIDER NOTE - ATTENDING CONTRIBUTION TO CARE
stable to baseline mental status on arrival to OR, VS stable Brief HPI:  53-year-old male denies past medical history presents with left-sided chest pain starting 3 days ago.  Symptoms are somewhat exertional, radiates to the left arm and posterior head, relieved by rest.  Symptoms are also associated with lightheadedness.  Never had symptoms like this in the past.  Non-smoker.  Has brother with CAD and CABG.  No recent travel or sick contacts.    Vitals:   Reviewed    Exam:    GEN:  Non-toxic appearing, non-distressed, speaking full sentences, non-diaphoretic, AAOx3  HEENT:  NCAT, neck supple, EOMI, PERRLA, sclera anicteric, no conjunctival pallor or injection, no stridor, normal voice, no tonsillar exudate, uvula midline  CV:  regular rhythm and rate, s1/s2 audible, no murmurs, rubs or gallops, peripheral pulses 2+ and symmetric  PULM:  non-labored respirations, lungs clear to auscultation bilaterally, no wheezes, crackles or rales  ABD:  non distended, non-tender, no rebound, no guarding, negative Villatoro's sign, bowel sounds normal, no cvat  MSK:  no gross deformity, non-tender extremities and joints, range of motion grossly normal appearing, no extremity edema, extremities warm and well perfused   NEURO:  AAOx3, CN II-XII intact, motor 5/5 in upper and lower extremities bilaterally, sensation grossly intact in extremities and trunk, no gait deficit  SKIN:  warm, dry, no rash or vesicles     A/P: 53-year-old male denies past medical history presents with left-sided chest pain starting 3 days ago. Vital signs stable.  EKG nonischemic.  Possible anginal pain given radiation to extremity.  Low concern for PE or aortic dissection.  Will send labs, chest x-ray, supportive care.  Disposition pending.

## 2024-06-01 NOTE — ED ADULT NURSE NOTE - OBJECTIVE STATEMENT
Patient received in intake. A&O4. Ambulatory. Denies past medical history. Came into ED with complaints of chest pressure x 3 days. States pain is constant and radiates to left bicep. Patient appears well, no signs of acute distress noted. Respirations even and unlabored. Denies SOB, palpitations, N/V/D,fevers. 20g IV placed left ac. labs drawn and sent. medicated per MD orders. Stretcher in lowest position. Call bell within reach.

## 2024-06-02 ENCOUNTER — RESULT REVIEW (OUTPATIENT)
Age: 54
End: 2024-06-02

## 2024-06-02 DIAGNOSIS — R07.9 CHEST PAIN, UNSPECIFIED: ICD-10-CM

## 2024-06-02 DIAGNOSIS — R07.89 OTHER CHEST PAIN: ICD-10-CM

## 2024-06-02 DIAGNOSIS — Z29.9 ENCOUNTER FOR PROPHYLACTIC MEASURES, UNSPECIFIED: ICD-10-CM

## 2024-06-02 LAB
ADD ON TEST-SPECIMEN IN LAB: SIGNIFICANT CHANGE UP
TROPONIN T, HIGH SENSITIVITY RESULT: <6 NG/L — SIGNIFICANT CHANGE UP

## 2024-06-02 PROCEDURE — 99223 1ST HOSP IP/OBS HIGH 75: CPT

## 2024-06-02 PROCEDURE — 71046 X-RAY EXAM CHEST 2 VIEWS: CPT | Mod: 26

## 2024-06-02 RX ORDER — ASPIRIN/CALCIUM CARB/MAGNESIUM 324 MG
81 TABLET ORAL DAILY
Refills: 0 | Status: DISCONTINUED | OUTPATIENT
Start: 2024-06-02 | End: 2024-06-03

## 2024-06-02 RX ORDER — ATORVASTATIN CALCIUM 80 MG/1
80 TABLET, FILM COATED ORAL AT BEDTIME
Refills: 0 | Status: DISCONTINUED | OUTPATIENT
Start: 2024-06-02 | End: 2024-06-03

## 2024-06-02 RX ORDER — METOPROLOL TARTRATE 50 MG
25 TABLET ORAL DAILY
Refills: 0 | Status: DISCONTINUED | OUTPATIENT
Start: 2024-06-02 | End: 2024-06-03

## 2024-06-02 RX ORDER — ASPIRIN/CALCIUM CARB/MAGNESIUM 324 MG
162 TABLET ORAL ONCE
Refills: 0 | Status: COMPLETED | OUTPATIENT
Start: 2024-06-02 | End: 2024-06-02

## 2024-06-02 RX ORDER — ASPIRIN/CALCIUM CARB/MAGNESIUM 324 MG
162 TABLET ORAL DAILY
Refills: 0 | Status: DISCONTINUED | OUTPATIENT
Start: 2024-06-02 | End: 2024-06-02

## 2024-06-02 RX ADMIN — Medication 25 MILLIGRAM(S): at 12:36

## 2024-06-02 RX ADMIN — ATORVASTATIN CALCIUM 80 MILLIGRAM(S): 80 TABLET, FILM COATED ORAL at 22:20

## 2024-06-02 RX ADMIN — Medication 81 MILLIGRAM(S): at 12:36

## 2024-06-02 RX ADMIN — Medication 162 MILLIGRAM(S): at 01:22

## 2024-06-02 NOTE — ED CDU PROVIDER DISPOSITION NOTE - DISPOSITION TYPE
ADMIT
I personally performed the service described in the documentation recorded by the scribe in my presence, and it accurately and completely records my words and actions.

## 2024-06-02 NOTE — H&P ADULT - NSHPPHYSICALEXAM_GEN_ALL_CORE
Vital Signs Last 24 Hrs  T(C): 36.8 (02 Jun 2024 09:36), Max: 36.9 (01 Jun 2024 20:55)  T(F): 98.2 (02 Jun 2024 09:36), Max: 98.4 (01 Jun 2024 20:55)  HR: 108 (02 Jun 2024 14:09) (75 - 108)  BP: 142/84 (02 Jun 2024 14:09) (130/81 - 161/88)  BP(mean): --  RR: 16 (02 Jun 2024 12:51) (16 - 18)  SpO2: 97% (02 Jun 2024 12:51) (95% - 98%)    Parameters below as of 02 Jun 2024 12:51  Patient On (Oxygen Delivery Method): room air    GENERAL: NAD  EYES: EOMI, conjunctiva and sclera clear  NECK: Supple, No JVD  CHEST/LUNG: Clear to auscultation bilaterally; No wheeze  HEART: Regular rate and rhythm; No murmurs, rubs, or gallops  ABDOMEN: Soft, Nontender, Nondistended; Bowel sounds present  EXTREMITIES:  2+ Peripheral Pulses, No clubbing, cyanosis, or edema  NEUROLOGY: non-focal  SKIN: No rashes or lesions

## 2024-06-02 NOTE — ED CDU PROVIDER DISPOSITION NOTE - ATTENDING APP SHARED VISIT CONTRIBUTION OF CARE
I Supervised the care of this patient from 7AM until 5PM on 6/2/24. LUIS. I Supervised the care of this patient from 7AM until admission on 6/2/24. ESTEVAN

## 2024-06-02 NOTE — CONSULT NOTE ADULT - ASSESSMENT
53-year-old male no past medical history however does not see primary care doctor regularly presents ED complaining of left-sided chest pressure.  Started 3 days ago with intermittent left-sided chest pressure radiating to left arm and posterior head.         #CHEST PAIN-ACS  -Presenting with substernal discomfort associated with botrapm-wvsyidkbwjwe-cfdjtdefma  -No troponin elevation or ischemic ECG achanges  -Fan Hx premature CAD Brother PCI at age 55 yr  -Ischemic khhs-wl-Tvoibyfkp nuclear stress test  -Strong suspicion for CAD if abnormal NST admit for cardiac cath in AM  -Start Aspirin statin

## 2024-06-02 NOTE — H&P ADULT - NSHPLABSRESULTS_GEN_ALL_CORE
< from: Nuclear Stress Test-Exercise.. (06.02.24 @ 07:14) >    Nuclear Exercise Stress Test Report         Patient: JODI NICHOLS                   Study Date: 6/2/2024           MRN: [id number]                  Birth Date/Age: 1970 Age: 53 years      Access #: [id number]                          Gender: M   Order Loc: [facility]                              Height: 172.7 cm/ 68.0 in  Request Phys: [id number] Not Available Doctor         Weight: 97.52 kg/ 215.00 lb     Procedure: Stress Tetrofosmin               BSA/ BMI: 2.11 m²/ 32.69 kg/m²    Indication: ChestPain - R07.9          Admiss Status: Inpatient    Fellow/ACP: Suze VILLASENOR               Fellow/ACP:    ---------------------------------------------------------------------------------------------------------------------------------------------------------  Procedure Code: TETROFOSMIN PER DOSE 2nd - .m;MYOCARDIAL SPECT SINGLE -                  16875.m;STRESS TEST TRACING ONLY - 33971.m    ---------------------------------------------------------------------------------------------------------------------------------------------------------  History:       53 year old man with family history of CAD arrived to hospital                 with chest pain.  Risk Factors:  Obesity and family history of coronary artery disease.  Image Quality: Good  Medications:   Aspirin.  Allergies:     PCN    --------------------------------------------------------------------------------------------------------------------------------------------------------Conclusions:   1. Normal myocardial perfusion scan, with no evidence of infarction or inducible ischemia.   2. The patient underwent stress testing using the standard Fidel protocol.      _ The patient exercised for 6 min 1 sec.      _ The test was stopped due to maximum exertion effort and fatigue.      _ The peak heart rate was 169 bpm; 102 % of predicted maximal heart rate for this patient.      _ The patient achieved 7 METS which is consistent with fair exercise capacity considering age and other clinical characteristics.   3. Baseline electrocardiogram: normal sinus rhythm at a rate of 81 bpm.   4. The left ventricle is normal in function and normal in size. The post stress left ventricular EF is 61 %. The stress end diastolic volume is 93 ml and systolic volume is 36 ml.    < end of copied text >

## 2024-06-02 NOTE — CONSULT NOTE ADULT - SUBJECTIVE AND OBJECTIVE BOX
MR:- [id number] :  NAME:SAMIA JODI:-    DATE OF SERVICE:06-02-24 @ 10:18    Patient was seen,examined and evaluated  by Mike No MD [id number] @ 10:18 .  ER evaluation, Labs and Hospital course was reviewed,    CHIEF COMPLAINT:Chjest Pain    HPI:53-year-old male no past medical history however does not see primary care doctor regularly presents ED complaining of left-sided chest pressure.  Started 3 days ago with intermittent left-sided chest pressure rating to left arm and posterior head.  Symptoms intermittent lasting for about 1 hour at a time, worse when walking, nonpleuritic, no associated diaphoresis, nausea, vomiting, diarrhea, shortness of breath, dysuria, hematuria.  Family history of CAD in father, brother.  Denies smoking history.  Denies recent travel.  No exacerbating or relieving factors.  Has not tried anything for symptoms.  Family Hx CAD Brother CAD PCI age 55      CARDIAC HISTORY:  [ ] CAD [ [PCI [ ] CABG [ ] Prior Cath  [ ] Atrial Fibrillation  Devices[ ] PPM [ ] ICD [ ]ILR  Heart Failure [ ] HFrEF [ ] HFpEF    PAST MEDICAL & SURGICAL HISTORY:  No pertinent past medical history      No significant past surgical history        Home Medications:   * Patient Currently Takes Medications as of 31-May-2018 09:33 documented in Structured Notes  · 	ciprofloxacin 500 mg oral tablet: 1 tab(s) orally every 12 hours  · 	metroNIDAZOLE 500 mg oral tablet: 1 tab(s) orally every 8 hours    FAMILY HISTORY:  No pertinent family history in first degree relatives      No family history of premature coronary artery disease or sudden cardiac death    SOCIAL HISTORY:  Smoking-[ ] Active  [ ] Former [x ] Non Smoker  Alcohol-[x ] Denies [ ] Social [ ] Daily  Ilicit Drug use-[x ] Denies [ ] Active user    REVIEW OF SYSTEMS:  Constitutional: [ ] fever, [ ]weight loss, [ ]fatigue   Activity [ ] Bedbound,[x ] Ambulates [ x] Unassisted[ ] Cane/Walker [ ] Assistence.  Effort tolerance:[ ] Excellent [ ] Good [x ] Fair [ ] Poor [ ]  Eyes: [ ] visual changes  Respiratory: [x ]shortness of breath;  [ ] cough, [ ]wheezing, [ ]chills, [ ]hemoptysis  Cardiovascular: [ x] chest pain, [ ]palpitations, [ ]dizziness,  [ ]leg swelling[ ]orthopnea [ ]PND  Gastrointestinal: [ ] abdominal pain, [ ]nausea, [ ]vomiting,  [ ]diarrhea,[ ]constipation  Genitourinary: [ ] dysuria, [ ] hematuria  Neurologic: [ ] headaches [ ] tremors[ ] weakness  Skin: [ ] itching, [ ]burning, [ ] rashes  Endocrine: [ ] heat or cold intolerance  Musculoskeletal: [ ] joint pain or swelling; [ ] muscle, back, or extremity pain  Psychiatric: [ ] depression, [ ]anxiety, [ ]mood swings, or [ ]difficulty sleeping  Hematologic: [ ] easy bruising, [ ] bleeding gums       [ x] All others negative	  [ ] Unable to obtain    Vital Signs Last 24 Hrs  T(C): 36.8 (02 Jun 2024 09:36), Max: 36.9 (01 Jun 2024 20:55)  T(F): 98.2 (02 Jun 2024 09:36), Max: 98.4 (01 Jun 2024 20:55)  HR: 75 (02 Jun 2024 09:36) (75 - 105)  BP: 152/97 (02 Jun 2024 09:36) (130/81 - 161/88)  RR: 17 (02 Jun 2024 09:36) (16 - 18)  SpO2: 98% (02 Jun 2024 09:36) (95% - 98%)    Parameters below as of 02 Jun 2024 09:36  Patient On (Oxygen Delivery Method): room air      I&O's Summary      PHYSICAL EXAM:  General: No acute distress BMI-31  HEENT: EOMI, PERRL[ ] Icteric  Neck: Supple, [ ] JVD  Lungs: Equal air entry bilaterally; [ ] Rales [ ] Rhonchi [ ] Wheezing  Heart: Regular rate and rhythm;[x ] Murmurs-   2/6 [x ] Systolic [ ] Diastolic [ ] Radiation,No rubs, or gallops  Abdomen: Nontender, bowel sounds present  Extremities: No clubbing, cyanosis, [ ] edema[ ] Calf tenderness  Nervous system:  Alert & Oriented X3, no focal deficits  Psychiatric: Normal affect  Skin: No rashes or lesions      LABS:  06-01    138  |  100  |  13  ----------------------------<  100<H>  4.5   |  24  |  0.73    Ca    9.6      01 Jun 2024 22:36    TPro  7.1  /  Alb  4.2  /  TBili  <0.2  /  DBili  x   /  AST  27  /  ALT  39  /  AlkPhos  100  06-01    Creatinine Trend: 0.73<--                        15.0   10.46 )-----------( 295      ( 01 Jun 2024 22:36 )             43.3         Troponin T, High Sensitivity (06.01.24 @ 23:59)   Troponin T, High Sensitivity Result: <6 ---> 11ng/L      D-Dimer Assay, Quantitative: <150      ECG:  Sinus Tachycardia at 1054 BPM  No acute ST T wave abnormalities       Xray Chest 2 Views PA/Lat (06.02.24 @ 01:13) >  FINDINGS:  The heart size is normal.  The lungs are clear.  No pleural effusion.  No pneumothorax.  No acute osseous abnormalities.    IMPRESSION:  No acute findings in the chest.

## 2024-06-02 NOTE — PATIENT PROFILE ADULT - FALL HARM RISK - HARM RISK INTERVENTIONS

## 2024-06-02 NOTE — ED CDU PROVIDER DISPOSITION NOTE - NSFOLLOWUPINSTRUCTIONS_ED_ALL_ED_FT
Follow up with your PMD within 1-2 days.  Recommend follow up with Cardiology within the week- Dr. No's information above or you can call: Find a Physician helpline ([phone]) for assistance   Recommend Aspirin 81mg over the counter daily until further evaluation.    Take all of your other medications as previously prescribed.   Worsening, continued or new concerning symptoms return to the Emergency Department.     Chest Pain    WHAT YOU NEED TO KNOW:    Chest pain can be caused by a range of conditions, from not serious to life-threatening. Chest pain can be a symptom of a digestive problem, such as acid reflux or a stomach ulcer. An anxiety attack or a strong emotion, such as anger, can also cause chest pain. Infection, inflammation, or a fracture in the bones or cartilage in your chest can cause pain or discomfort. Sometimes chest pain or pressure is caused by poor blood flow to your heart (angina). Chest pain may also be caused by life-threatening conditions such as a heart attack or blood clot in your lungs.     DISCHARGE INSTRUCTIONS:    Call 911 if:     You have any of the following signs of a heart attack:   Squeezing, pressure, or pain in your chest      You may also have any of the following:   Discomfort or pain in your back, neck, jaw, stomach, or arm      Shortness of breath      Nausea or vomiting      Lightheadedness or a sudden cold sweat        Seek care immediately if:     You have chest discomfort that gets worse, even with medicine.      You cough or vomit blood.       Your bowel movements are black or bloody.       You cannot stop vomiting, or it hurts to swallow.     Contact your healthcare provider if:     You have questions or concerns about your condition or care.        Medicines:     Medicines may be given to treat the cause of your chest pain. Examples include pain medicine, anxiety medicine, or medicines to increase blood flow to your heart.       Do not take certain medicines without asking your healthcare provider first. These include NSAIDs, herbal or vitamin supplements, or hormones (estrogen or progestin).       Take your medicine as directed. Contact your healthcare provider if you think your medicine is not helping or if you have side effects. Tell him or her if you are allergic to any medicine. Keep a list of the medicines, vitamins, and herbs you take. Include the amounts, and when and why you take them. Bring the list or the pill bottles to follow-up visits. Carry your medicine list with you in case of an emergency.    Follow up with your healthcare provider within 72 hours, or as directed: You may need to return for more tests to find the cause of your chest pain. You may be referred to a specialist, such as a cardiologist or gastroenterologist. Write down your questions so you remember to ask them during your visits.     Healthy living tips: The following are general healthy guidelines. If your chest pain is caused by a heart problem, your healthcare provider will give you specific guidelines to follow.    Do not smoke. Nicotine and other chemicals in cigarettes and cigars can cause lung and heart damage. Ask your healthcare provider for information if you currently smoke and need help to quit. E-cigarettes or smokeless tobacco still contain nicotine. Talk to your healthcare provider before you use these products.       Eat a variety of healthy, low-fat, low-salt foods. Healthy foods include fruits, vegetables, whole-grain breads, low-fat dairy products, beans, lean meats, and fish. Ask for more information about a heart healthy diet.      Drink plenty of water every day. Your body is made of mostly water. Water helps your body to control temperature and blood pressure. Ask your healthcare provider how much water you should drink every day.      Ask about activity. Your healthcare provider will tell you which activities to limit or avoid. Ask when you can drive, return to work, and have sex. Ask about the best exercise plan for you.      Maintain a healthy weight. Ask your healthcare provider how much you should weigh. Ask him or her to help you create a weight loss plan if you are overweight.     If you have a stent:     Carry your stent card with you at all times.       Let all healthcare providers know that you have a stent.

## 2024-06-02 NOTE — ED CDU PROVIDER INITIAL DAY NOTE - CLINICAL SUMMARY MEDICAL DECISION MAKING FREE TEXT BOX
in ED EKG without acute ischemic changes, trop 11, 6. Pt sent to CDU for r/o ACS with tele monitoring, stress, and tele doc of the day eval.

## 2024-06-02 NOTE — ED CDU PROVIDER INITIAL DAY NOTE - OBJECTIVE STATEMENT
53-year-old male no past medical history however does not see primary care doctor regularly presents ED complaining of left-sided chest pressure.  Started 3 days ago with intermittent left-sided chest pressure rating to left arm and posterior head.  Symptoms intermittent lasting for about 1 hour at a time, worse when walking, nonpleuritic, no associated diaphoresis, nausea, vomiting, diarrhea, shortness of breath, dysuria, hematuria.  Family history of CAD in father, brother.  Denies smoking history.  Denies recent travel.  No exacerbating or relieving factors.  Has not tried anything for symptoms.  CDU HAMILTON Johnson: Agree with above, in ED EKG without acute ischemic changes, trop 11, 6. Pt sent to CDU for r/o ACS with tele monitoring, stress, and tele doc of the day eval.

## 2024-06-02 NOTE — ED CDU PROVIDER DISPOSITION NOTE - CLINICAL COURSE
53-year-old male no past medical history however does not see primary care doctor regularly presents ED complaining of left-sided chest pressure.  Started 3 days ago with intermittent left-sided chest pressure rating to left arm and posterior head.  Family history of CAD in father, brother.  Denies smoking history.  Denies recent travel.  No exacerbating or relieving factors.  Has not tried anything for symptoms.  Within ED EKG without acute ischemic changes, trop 11, 6. Pt sent to CDU for r/o ACS with tele monitoring, stress, and tele doc of the day eval.  NSR on tele overnight. Patient reassessed in morning and without CP. Seen by Cardiologist Dr. No. Had Stress test done results: _____________________. Will DC with Cardiologist follow up outpt. Strict ED return precautions discussed. Patient understands and agrees. 53-year-old male no past medical history however does not see primary care doctor regularly presents ED complaining of left-sided chest pressure.  Started 3 days ago with intermittent left-sided chest pressure rating to left arm and posterior head.  Family history of CAD in father, brother.  Denies smoking history.  Denies recent travel.  No exacerbating or relieving factors.  Has not tried anything for symptoms.  Within ED EKG without acute ischemic changes, trop 11, 6. Pt sent to CDU for r/o ACS with tele monitoring, stress, and tele doc of the day eval.  NSR on tele overnight. Patient reassessed in morning and without CP. Seen by Cardiologist Dr. No.  However during the stress test he developed CP again. Dr. No would like him admitted for a cardiac cath tomorrow. NPO after midnight

## 2024-06-02 NOTE — H&P ADULT - HISTORY OF PRESENT ILLNESS
Pt is 53M presented with c/o exertional chest pressure radiating to his head.  He underwent exercise stress testing with persistent symptoms, says they werent worse during test.  Cardiologist requesting admission for cardiac cath.

## 2024-06-02 NOTE — ED ADULT NURSE REASSESSMENT NOTE - NS ED NURSE REASSESS COMMENT FT1
Received patient from nuclear stress test awake and alert, denies chest pain but states " a little something there".  Presently NSR on cardiac monitor, resting comfortably .

## 2024-06-02 NOTE — CONSULT NOTE ADULT - TIME BILLING
- Review of records, telemetry, vital signs and daily labs.   - General and cardiovascular physical examination.  - Generation of cardiovascular treatment plan.  - Coordination of care.      Patient was seen and examined by me on  06/02/2024,interim events noted,labs and radiology studies reviewed.  Mike No MD,FACC.  [address] [place], [place] [id number].  [phone]

## 2024-06-02 NOTE — ED CDU PROVIDER INITIAL DAY NOTE - ATTENDING APP SHARED VISIT CONTRIBUTION OF CARE
I have evaluated the patient and agree with the documentation and assessment as made by the PA. We have discussed plan of care and work up for the patient.    Exam:    GEN:  Non-toxic appearing, non-distressed, speaking full sentences, non-diaphoretic, AAOx3  HEENT:  NCAT, neck supple, EOMI, PERRLA, sclera anicteric, no conjunctival pallor or injection, no stridor, normal voice, no tonsillar exudate, uvula midline  CV:  regular rhythm and rate, s1/s2 audible, no murmurs, rubs or gallops, peripheral pulses 2+ and symmetric  PULM:  non-labored respirations, lungs clear to auscultation bilaterally, no wheezes, crackles or rales  ABD:  non distended, non-tender, no rebound, no guarding, negative Villatoro's sign, bowel sounds normal, no cvat  MSK:  no gross deformity, non-tender extremities and joints, range of motion grossly normal appearing, no extremity edema, extremities warm and well perfused   NEURO:  AAOx3, CN II-XII intact, motor 5/5 in upper and lower extremities bilaterally, sensation grossly intact in extremities and trunk, no gait deficit  SKIN:  warm, dry, no rash or vesicles

## 2024-06-02 NOTE — H&P ADULT - PROBLEM SELECTOR PLAN 1
Negative stress test  Management per cardiology, on beta blocker, ASA, statin  plan for cardiac cath in AM  lipid profile in am

## 2024-06-02 NOTE — ED CDU PROVIDER DISPOSITION NOTE - CARE PROVIDER_API CALL
Mike No  Cardiology  [address] [place] [address]  Phone: [phone]  Fax: [phone]  Follow Up Time:

## 2024-06-02 NOTE — ED ADULT NURSE REASSESSMENT NOTE - NS ED NURSE REASSESS COMMENT FT1
Pt is A&Ox4, appears comfortable in stretcher. Pt offers no new complaints or requests at this time. denies pain or SOB. breathing is even and unlabored. NSR on cardiac monitor. awaiting nuclear stress test. Stretcher set in lowest position, call bell within reach, safety maintained.

## 2024-06-03 ENCOUNTER — TRANSCRIPTION ENCOUNTER (OUTPATIENT)
Age: 54
End: 2024-06-03

## 2024-06-03 VITALS
DIASTOLIC BLOOD PRESSURE: 86 MMHG | SYSTOLIC BLOOD PRESSURE: 148 MMHG | HEART RATE: 81 BPM | OXYGEN SATURATION: 100 % | RESPIRATION RATE: 18 BRPM | TEMPERATURE: 98 F

## 2024-06-03 LAB
ANION GAP SERPL CALC-SCNC: 12 MMOL/L — SIGNIFICANT CHANGE UP (ref 7–14)
BASOPHILS # BLD AUTO: 0.06 K/UL — SIGNIFICANT CHANGE UP (ref 0–0.2)
BASOPHILS NFR BLD AUTO: 0.6 % — SIGNIFICANT CHANGE UP (ref 0–2)
BUN SERPL-MCNC: 16 MG/DL — SIGNIFICANT CHANGE UP (ref 7–23)
CALCIUM SERPL-MCNC: 9.2 MG/DL — SIGNIFICANT CHANGE UP (ref 8.4–10.5)
CHLORIDE SERPL-SCNC: 103 MMOL/L — SIGNIFICANT CHANGE UP (ref 98–107)
CHOLEST SERPL-MCNC: 169 MG/DL — SIGNIFICANT CHANGE UP
CO2 SERPL-SCNC: 23 MMOL/L — SIGNIFICANT CHANGE UP (ref 22–31)
CREAT SERPL-MCNC: 0.72 MG/DL — SIGNIFICANT CHANGE UP (ref 0.5–1.3)
EGFR: 109 ML/MIN/1.73M2 — SIGNIFICANT CHANGE UP
EOSINOPHIL # BLD AUTO: 0.27 K/UL — SIGNIFICANT CHANGE UP (ref 0–0.5)
EOSINOPHIL NFR BLD AUTO: 2.6 % — SIGNIFICANT CHANGE UP (ref 0–6)
GLUCOSE SERPL-MCNC: 100 MG/DL — HIGH (ref 70–99)
HCT VFR BLD CALC: 43.5 % — SIGNIFICANT CHANGE UP (ref 39–50)
HCV AB S/CO SERPL IA: 0.08 S/CO — SIGNIFICANT CHANGE UP (ref 0–0.99)
HCV AB SERPL-IMP: SIGNIFICANT CHANGE UP
HDLC SERPL-MCNC: 30 MG/DL — LOW
HGB BLD-MCNC: 14.3 G/DL — SIGNIFICANT CHANGE UP (ref 13–17)
IANC: 5.54 K/UL — SIGNIFICANT CHANGE UP (ref 1.8–7.4)
IMM GRANULOCYTES NFR BLD AUTO: 0.5 % — SIGNIFICANT CHANGE UP (ref 0–0.9)
LIPID PNL WITH DIRECT LDL SERPL: 103 MG/DL — HIGH
LYMPHOCYTES # BLD AUTO: 3.55 K/UL — HIGH (ref 1–3.3)
LYMPHOCYTES # BLD AUTO: 34.6 % — SIGNIFICANT CHANGE UP (ref 13–44)
MAGNESIUM SERPL-MCNC: 2.2 MG/DL — SIGNIFICANT CHANGE UP (ref 1.6–2.6)
MCHC RBC-ENTMCNC: 28.7 PG — SIGNIFICANT CHANGE UP (ref 27–34)
MCHC RBC-ENTMCNC: 32.9 GM/DL — SIGNIFICANT CHANGE UP (ref 32–36)
MCV RBC AUTO: 87.2 FL — SIGNIFICANT CHANGE UP (ref 80–100)
MONOCYTES # BLD AUTO: 0.8 K/UL — SIGNIFICANT CHANGE UP (ref 0–0.9)
MONOCYTES NFR BLD AUTO: 7.8 % — SIGNIFICANT CHANGE UP (ref 2–14)
NEUTROPHILS # BLD AUTO: 5.54 K/UL — SIGNIFICANT CHANGE UP (ref 1.8–7.4)
NEUTROPHILS NFR BLD AUTO: 53.9 % — SIGNIFICANT CHANGE UP (ref 43–77)
NON HDL CHOLESTEROL: 139 MG/DL — HIGH
NRBC # BLD: 0 /100 WBCS — SIGNIFICANT CHANGE UP (ref 0–0)
NRBC # FLD: 0 K/UL — SIGNIFICANT CHANGE UP (ref 0–0)
PHOSPHATE SERPL-MCNC: 4 MG/DL — SIGNIFICANT CHANGE UP (ref 2.5–4.5)
PLATELET # BLD AUTO: 277 K/UL — SIGNIFICANT CHANGE UP (ref 150–400)
POTASSIUM SERPL-MCNC: 3.8 MMOL/L — SIGNIFICANT CHANGE UP (ref 3.5–5.3)
POTASSIUM SERPL-SCNC: 3.8 MMOL/L — SIGNIFICANT CHANGE UP (ref 3.5–5.3)
RBC # BLD: 4.99 M/UL — SIGNIFICANT CHANGE UP (ref 4.2–5.8)
RBC # FLD: 12.5 % — SIGNIFICANT CHANGE UP (ref 10.3–14.5)
SODIUM SERPL-SCNC: 138 MMOL/L — SIGNIFICANT CHANGE UP (ref 135–145)
TRIGL SERPL-MCNC: 180 MG/DL — HIGH
WBC # BLD: 10.27 K/UL — SIGNIFICANT CHANGE UP (ref 3.8–10.5)
WBC # FLD AUTO: 10.27 K/UL — SIGNIFICANT CHANGE UP (ref 3.8–10.5)

## 2024-06-03 PROCEDURE — 99152 MOD SED SAME PHYS/QHP 5/>YRS: CPT

## 2024-06-03 PROCEDURE — 93458 L HRT ARTERY/VENTRICLE ANGIO: CPT | Mod: 26

## 2024-06-03 RX ORDER — METOPROLOL TARTRATE 50 MG
1 TABLET ORAL
Qty: 0 | Refills: 0 | DISCHARGE
Start: 2024-06-03

## 2024-06-03 RX ORDER — ASPIRIN/CALCIUM CARB/MAGNESIUM 324 MG
1 TABLET ORAL
Qty: 30 | Refills: 0
Start: 2024-06-03 | End: 2024-07-02

## 2024-06-03 RX ORDER — ASPIRIN/CALCIUM CARB/MAGNESIUM 324 MG
1 TABLET ORAL
Qty: 0 | Refills: 0 | DISCHARGE
Start: 2024-06-03

## 2024-06-03 RX ORDER — METOPROLOL TARTRATE 50 MG
1 TABLET ORAL
Qty: 30 | Refills: 0
Start: 2024-06-03 | End: 2024-07-02

## 2024-06-03 RX ORDER — ATORVASTATIN CALCIUM 80 MG/1
1 TABLET, FILM COATED ORAL
Qty: 30 | Refills: 0
Start: 2024-06-03 | End: 2024-07-02

## 2024-06-03 RX ORDER — ATORVASTATIN CALCIUM 80 MG/1
1 TABLET, FILM COATED ORAL
Qty: 0 | Refills: 0 | DISCHARGE
Start: 2024-06-03

## 2024-06-03 RX ADMIN — Medication 81 MILLIGRAM(S): at 12:07

## 2024-06-03 NOTE — PROGRESS NOTE ADULT - ASSESSMENT
53-year-old male no past medical history however does not see primary care doctor regularly presents ED complaining of left-sided chest pressure.  Started 3 days ago with intermittent left-sided chest pressure radiating to left arm and posterior head.         #CHEST PAIN-ACS  -Presenting with substernal discomfort associated with hjpnzbr-cskdxdorpjid-cbdwvlxvmr  -No troponin elevation or ischemic ECG achanges  -Fan Hx premature CAD Brother PCI at age 55 yr  -Ischemic dlez-zr-Zbyixvtap nuclear stress test- Normal myocardial perfusion scan, with no evidence of infarction or inducible ischemia.                                 - Symptoms During Stress: Chest pressure.                                 - Arrhythmias: Frequent VPD's occurred during recovery.  -Strong suspicion for CAD  -Due to persistent chest discomfort will schedule for cardiac cath today  -Start Aspirin statin       #HTN  -No prior Hx HTN  -Noted elevated BP  -Started on Toprol 25 mg daily  -BP better    #HLD  Cholesterol: 169 mg/dL  Triglycerides, Serum: 180 mg/dL  HDL Cholesterol: 30 mg/dL  Non HDL Cholesterol: 139 mg/dL  LDL Cholesterol Calculated: 103 mg/dL  On Atorvastatin 80mg daily  If non obstructive CAD on cath change to 20mg daily

## 2024-06-03 NOTE — PRE PROCEDURE NOTE - PRE PROCEDURE EVALUATION
Pre-sedation evaluation    Dentures: NONE  Last PO intake: 6/2/24 at 21:30    Level of consciousness: 1  Obstructive sleep apnea: No  Aspiration risk: No  Mallampati score: 2  ASA Classification: 2  Prior Sedative or Anesthesia Experience: No complications  Informed consent by responsible adult: Yes  Responsible adult escort: NA  Based on today's assessment, anesthesia consult requested: No

## 2024-06-03 NOTE — DISCHARGE NOTE NURSING/CASE MANAGEMENT/SOCIAL WORK - NSDCVIVACCINE_GEN_ALL_CORE_FT
Tdap; 28-Sep-2020 22:32; Marisa Rose (RN); [employer]; [id number] (Exp. Date: 31-Jul-2022); IntraMuscular; Deltoid Left.; 0.5 milliLiter(s); VIS (VIS Published: 09-May-2013, VIS Presented: 28-Sep-2020);

## 2024-06-03 NOTE — DISCHARGE NOTE PROVIDER - NSDCMRMEDTOKEN_GEN_ALL_CORE_FT
aspirin 81 mg oral delayed release tablet: 1 tab(s) orally once a day  atorvastatin 80 mg oral tablet: 1 tab(s) orally once a day (at bedtime)  metoprolol succinate 25 mg oral tablet, extended release: 1 tab(s) orally once a day

## 2024-06-03 NOTE — DISCHARGE NOTE PROVIDER - HOSPITAL COURSE
53M with CP admitted for cardiac cath  Chest pain.   - Negative stress test  -started metoprolol 25mg BID, ASA, statin  plan for cardiac cath 6/4       53M with CP admitted for cardiac cath  Chest pain.   - Negative stress test  -started metoprolol 25mg BID, ASA, statin  6/3 Cardiac cath - mLAD 20%, RCA mild disease, Ramus - mild disease    Pt is medically stable for dc  with outpt follow up

## 2024-06-03 NOTE — PROGRESS NOTE ADULT - TIME BILLING
- Review of records, telemetry, vital signs and daily labs.   - General and cardiovascular physical examination.  - Generation of cardiovascular treatment plan.  - Coordination of care.      Patient was seen and examined by me on  06/03/2024,interim events noted,labs and radiology studies reviewed.  Mike No MD,FACC.  [address] [place], [place] [id number].  [phone]

## 2024-06-03 NOTE — PROGRESS NOTE ADULT - ASSESSMENT
53-year-old male no past medical history however does not see primary care doctor regularly presents ED complaining of left-sided chest pressure.  Started 3 days ago with intermittent left-sided chest pressure radiating to left arm and posterior head.         #CHEST PAIN-ACS  -Presenting with substernal discomfort associated with kezynph-yanfbjusrlga-ehufwtojml  -No troponin elevation or ischemic ECG achanges  -Family Hx premature CAD Brother PCI at age 55 yr  -Ischemic vwkh-iw-Nibneofqy nuclear stress test- Normal myocardial perfusion scan, with no evidence of infarction or inducible ischemia.                                 - Symptoms During Stress: Chest pressure.                                 - Arrhythmias: Frequent VPD's occurred during recovery.  -Strong suspicion for CAD  cath with min disease   - cards cleared pt for dc      #HTN  -No prior Hx HTN  -Noted elevated BP  -Started on Toprol 25 mg daily  -BP better    #HLD  Cholesterol: 169 mg/dL  Triglycerides, Serum: 180 mg/dL  HDL Cholesterol: 30 mg/dL  Non HDL Cholesterol: 139 mg/dL  LDL Cholesterol Calculated: 103 mg/dL  On Atorvastatin 80mg daily  If non obstructive CAD on cath change to 20mg daily

## 2024-06-03 NOTE — DISCHARGE NOTE NURSING/CASE MANAGEMENT/SOCIAL WORK - PATIENT PORTAL LINK FT
You can access the FollowMyHealth Patient Portal offered by [facility] by registering at the following website: [email or web address]. By joining [employer]’s FollowMyHealth portal, you will also be able to view your health information using other applications (apps) compatible with our system.

## 2024-06-03 NOTE — DISCHARGE NOTE PROVIDER - NSDCCPCAREPLAN_GEN_ALL_CORE_FT
PRINCIPAL DISCHARGE DIAGNOSIS  Diagnosis: Chest wall pain  Assessment and Plan of Treatment: Stress test is unremarkable  -started metoprolol 25mg 2 x day, take daily  ASA 81 mg, and Atorvastatin     PRINCIPAL DISCHARGE DIAGNOSIS  Diagnosis: Chest wall pain  Assessment and Plan of Treatment: Stress test is unremarkable  -started metoprolol 25mg 2 x day, take daily  ASA 81 mg, and Atorvastatin 8o mg QHS  6/3 Cardiac cath - mLAD 20%, RCA mild disease, Ramus - mild disease       PRINCIPAL DISCHARGE DIAGNOSIS  Diagnosis: Chest wall pain  Assessment and Plan of Treatment: Stress test is unremarkable  -started metoprolol 25mg 2 x day, take daily  ASA 81 mg, and Atorvastatin 8o mg QHS  6/3 Cardiac cath - mLAD 20%, RCA mild disease, Ramus - mild disease  Please follow up w/ Dr No in 2 weeks  you will need to monitor liver function since you are taking Atorvastatin

## 2024-06-03 NOTE — PROGRESS NOTE ADULT - SUBJECTIVE AND OBJECTIVE BOX
SUBJECTIVE / OVERNIGHT EVENTS:Pt seen and examined  06-03-24     MEDICATIONS  (STANDING):  aspirin enteric coated 81 milliGRAM(s) Oral daily  atorvastatin 80 milliGRAM(s) Oral at bedtime  metoprolol succinate ER 25 milliGRAM(s) Oral daily    MEDICATIONS  (PRN):    T(C): 36.8 (06-03-24 @ 18:30), Max: 37 (06-02-24 @ 22:20)  HR: 81 (06-03-24 @ 18:30) (67 - 95)  BP: 148/86 (06-03-24 @ 18:30) (118/84 - 154/90)  RR: 18 (06-03-24 @ 18:30) (16 - 19)  SpO2: 100% (06-03-24 @ 18:30) (95% - 100%)    CAPILLARY BLOOD GLUCOSE        I&O's Summary      Constitutional: No fever, fatigue  Skin: No rash.  Eyes: No recent vision problems or eye pain.  ENT: No congestion, ear pain, or sore throat.  Cardiovascular: No chest pain or palpation.  Respiratory: No cough, shortness of breath, congestion, or wheezing.  Gastrointestinal: No abdominal pain, nausea, vomiting, or diarrhea.  Genitourinary: No dysuria.  Musculoskeletal: No joint swelling.  Neurologic: No headache.    PHYSICAL EXAM:  GENERAL: NAD  EYES: EOMI, PERRLA  NECK: Supple, No JVD  CHEST/LUNG: cta leonel  HEART:  S1 , S2 +  ABDOMEN: soft, bs+  EXTREMITIES:  no edema  NEUROLOGY:alert awake oriented       LABS:                        14.3   10.27 )-----------( 277      ( 03 Jun 2024 05:55 )             43.5     06-03    138  |  103  |  16  ----------------------------<  100<H>  3.8   |  23  |  0.72    Ca    9.2      03 Jun 2024 05:55  Phos  4.0     06-03  Mg     2.20     06-03    TPro  7.1  /  Alb  4.2  /  TBili  <0.2  /  DBili  x   /  AST  27  /  ALT  39  /  AlkPhos  100  06-01          Urinalysis Basic - ( 03 Jun 2024 05:55 )    Color: x / Appearance: x / SG: x / pH: x  Gluc: 100 mg/dL / Ketone: x  / Bili: x / Urobili: x   Blood: x / Protein: x / Nitrite: x   Leuk Esterase: x / RBC: x / WBC x   Sq Epi: x / Non Sq Epi: x / Bacteria: x        RADIOLOGY & ADDITIONAL TESTS:    Imaging Personally Reviewed:    Consultant(s) Notes Reviewed:      Care Discussed with Consultants/Other Providers:  
MR#[id number]  PATIENT NAME:DANIELLE NICHOLS    DATE OF SERVICE: 06-03-24 @ 08:12  Patient was seen and examined by Mike No MD on    06-03-24 @ 08:12 .  Interim events noted.Consultant notes ,Labs,Telemetry reviewed by me       HOSPITAL COURSE: HPI:  53-year-old male no past medical history however does not see primary care doctor regularly presents ED complaining of left-sided chest pressure.  Started 3 days ago with intermittent left-sided chest pressure rating to left arm and posterior head.  Symptoms intermittent lasting for about 1 hour at a time, worse when walking, nonpleuritic, no associated diaphoresis, nausea, vomiting, diarrhea, shortness of breath, dysuria, hematuria.  Family history of CAD in father, brother.  Denies smoking history.  Denies recent travel.  No exacerbating or relieving factors.  Has not tried anything for symptoms.  Family Hx CAD Brother CAD PCI age 55.    He underwent exercise nuclear stress testing with persistent symptoms, says they werent worse during test.        INTERIM EVENTS:Patient seen at bedside ,interim events noted.Awake alert still refers to substernal interrmittent chest discomfort  NST no evident focal ischemia but in view of persistent symptoms is scheduled for Cardiac cath today      PMH -reviewed admission note, no change since admission  HEART FAILURE: Acute[ ]Chronic[ ] Systolic[ ] Diastolic[ ] Combined Systolic and Diastolic[ ]  CAD[ ] CABG[ ] PCI[ ]  DEVICES[ ] PPM[ ] ICD[ ] ILR[ ]  ATRIAL FIBRILLATION[ ] Paroxysmal[ ] Permanent[ ] CHADS2-[  ]  BART[ ] CKD1[ ] CKD2[ ] CKD3[ ] CKD4[ ] ESRD[ ]  COPD[ ] HTN[ ]   DM[ ] Type1[ ] Type 2[ ]   CVA[ ] Paresis[ ]    AMBULATION: Assisted[ ] Cane/walker[ ] Independent[ ]    MEDICATIONS  (STANDING):  aspirin enteric coated 81 milliGRAM(s) Oral daily  atorvastatin 80 milliGRAM(s) Oral at bedtime  metoprolol succinate ER 25 milliGRAM(s) Oral daily              REVIEW OF SYSTEMS:  Constitutional: [ ] fever, [ ]weight loss,  [ ]fatigue [ ]weight gain  Eyes: [ ] visual changes  Respiratory: [ ]shortness of breath;  [ ] cough, [ ]wheezing, [ ]chills, [ ]hemoptysis  Cardiovascular: [ x] chest pain, [ ]palpitations, [ ]dizziness,  [ ]leg swelling[ ]orthopnea[ ]PND  Gastrointestinal: [ ] abdominal pain, [ ]nausea, [ ]vomiting,  [ ]diarrhea [ ]Constipation [ ]Melena  Genitourinary: [ ] dysuria, [ ] hematuria [ ][place]  Neurologic: [ ] headaches [ ] tremors[ ]weakness [ ]Paralysis Right[ ] Left[ ]  Skin: [ ] itching, [ ]burning, [ ] rashes  Endocrine: [ ] heat or cold intolerance  Musculoskeletal: [ ] joint pain or swelling; [ ] muscle, back, or extremity pain  Psychiatric: [ ] depression, [ ]anxiety, [ ]mood swings, or [ ]difficulty sleeping  Hematologic: [ ] easy bruising, [ ] bleeding gums    [ ] All remaining systems negative except as per above.   [ ]Unable to obtain.  [x] No change in ROS since admission      Vital Signs Last 24 Hrs  T(C): 36.6 (03 Jun 2024 05:18), Max: 37 (02 Jun 2024 16:41)  T(F): 97.9 (03 Jun 2024 05:18), Max: 98.6 (02 Jun 2024 16:41)  HR: 67 (03 Jun 2024 05:18) (67 - 108)  BP: 118/84 (03 Jun 2024 05:18) (118/84 - 157/98)  RR: 18 (03 Jun 2024 05:18) (16 - 18)  SpO2: 100% (03 Jun 2024 05:18) (96% - 100%)    Parameters below as of 03 Jun 2024 05:18  Patient On (Oxygen Delivery Method): room air      I&O's Summary      PHYSICAL EXAM:  General: No acute distress BMI-30  HEENT: EOMI, PERRL  Neck: Supple, [ ] JVD  Lungs: Equal air entry bilaterally; [ ] rales [ ] wheezing [ ] rhonchi  Heart: Regular rate and rhythm; [x ] murmur   2/6 [ x] systolic [ ] diastolic [ ] radiation[ ] rubs [ ]  gallops  Abdomen: Nontender, bowel sounds present  Extremities: No clubbing, cyanosis, [ ] edema [ ]Pulses  equal and intact  Nervous system:  Alert & Oriented X3, no focal deficits  Psychiatric: Normal affect  Skin: No rashes or lesions    LABS:  06-03    138  |  103  |  16  ----------------------------<  100<H>  3.8   |  23  |  0.72    Ca    9.2      03 Jun 2024 05:55  Phos  4.0     06-03  Mg     2.20     06-03    TPro  7.1  /  Alb  4.2  /  TBili  <0.2  /  DBili  x   /  AST  27  /  ALT  39  /  AlkPhos  100  06-01    Creatinine Trend: 0.72<--, 0.73<--                        14.3   10.27 )-----------( 277      ( 03 Jun 2024 05:55 )             43.5        Nuclear Stress Test-Exercise.. (06.02.24 @ 07:14) >  Conclusions:Symptoms During Stress: Chest pressure.                         Arrhythmias: Frequent VPD's occurred during recovery.     1. Normal myocardial perfusion scan, with no evidence of infarction or inducible ischemia.   2. The patient underwent stress testing using the standard Fidel protocol.      _ The patient exercised for 6 min 1 sec.      _ The test was stopped due to maximum exertion effort and fatigue.      _ The peak heart rate was 169 bpm; 102 % of predicted maximal heart rate for this patient.      _ The patient achieved 7 METS which is consistent with fair exercise capacity considering age and other clinical characteristics.   3. Baseline electrocardiogram: normal sinus rhythm at a rate of 81 bpm.   4. The left ventricle is normal in function and normal in size. The post stress left ventricular EF is 61 %. The stress end diastolic volume is 93 ml and systolic volume is 36 ml.      Xray Chest 2 Views PA/Lat (06.02.24 @ 01:13) >  IMPRESSION:  Clear lungs. No pleural effusions or pneumothorax.  Cardiac and mediastinal silhouettes within normal limits.  Trachea midline.  Unremrkable osseous structures.      ECG:   Normal sinus rhythm at a rate of 81 bpm.   No acute ST T wave abnormalities

## 2024-06-03 NOTE — DISCHARGE NOTE NURSING/CASE MANAGEMENT/SOCIAL WORK - NSCORESITESY/N_GEN_A_CORE_RD
From: Aylin Del Cid  To: Steven Mccormick MD  Sent: 1/21/2021 6:00 PM CST  Subject: Other    Friday, 1/22 Colonoscopy, et. Vasyl Roger.  Seems the pre-authorization has not been officially assigned an authorization code as of 6:00 PM 1/21.   They needed at least
No

## 2024-06-03 NOTE — DISCHARGE NOTE NURSING/CASE MANAGEMENT/SOCIAL WORK - NSDCPEFALRISK_GEN_ALL_CORE
For information on Fall & Injury Prevention, visit: [email or web address] OR  [email or web address] OR  https://www.cdc.gov/steadi/patient.html

## 2024-07-01 RX ORDER — METOPROLOL TARTRATE 50 MG
1 TABLET ORAL
Qty: 90 | Refills: 2
Start: 2024-07-01 | End: 2025-03-27

## 2025-03-16 NOTE — PRE-OP CHECKLIST - SURGICAL CONSENT
Patient provided with discharge instructions. Verbalized understanding for plan of care at home and follow up. All questions/ concerns addressed prior to discharge.    done